# Patient Record
Sex: FEMALE | Employment: OTHER | URBAN - NONMETROPOLITAN AREA
[De-identification: names, ages, dates, MRNs, and addresses within clinical notes are randomized per-mention and may not be internally consistent; named-entity substitution may affect disease eponyms.]

---

## 2024-09-11 ENCOUNTER — OFFICE VISIT (OUTPATIENT)
Dept: CARDIOLOGY | Facility: CLINIC | Age: 74
End: 2024-09-11
Payer: MEDICARE

## 2024-09-11 VITALS
DIASTOLIC BLOOD PRESSURE: 80 MMHG | SYSTOLIC BLOOD PRESSURE: 166 MMHG | BODY MASS INDEX: 42.93 KG/M2 | HEIGHT: 61 IN | HEART RATE: 82 BPM | WEIGHT: 227.4 LBS

## 2024-09-11 DIAGNOSIS — Z78.9 NEVER SMOKED TOBACCO: ICD-10-CM

## 2024-09-11 DIAGNOSIS — I10 ESSENTIAL HYPERTENSION: ICD-10-CM

## 2024-09-11 DIAGNOSIS — C18.9 MALIGNANT NEOPLASM OF COLON, UNSPECIFIED PART OF COLON (MULTI): ICD-10-CM

## 2024-09-11 DIAGNOSIS — R06.02 SHORTNESS OF BREATH: ICD-10-CM

## 2024-09-11 PROBLEM — R07.9 CHEST PAIN: Status: ACTIVE | Noted: 2024-09-11

## 2024-09-11 PROBLEM — R07.9 CHEST PAIN: Status: RESOLVED | Noted: 2024-09-11 | Resolved: 2024-09-11

## 2024-09-11 PROCEDURE — 1036F TOBACCO NON-USER: CPT | Performed by: INTERNAL MEDICINE

## 2024-09-11 PROCEDURE — 99204 OFFICE O/P NEW MOD 45 MIN: CPT | Performed by: INTERNAL MEDICINE

## 2024-09-11 PROCEDURE — 3008F BODY MASS INDEX DOCD: CPT | Performed by: INTERNAL MEDICINE

## 2024-09-11 PROCEDURE — 3077F SYST BP >= 140 MM HG: CPT | Performed by: INTERNAL MEDICINE

## 2024-09-11 PROCEDURE — 93000 ELECTROCARDIOGRAM COMPLETE: CPT | Performed by: INTERNAL MEDICINE

## 2024-09-11 PROCEDURE — 1159F MED LIST DOCD IN RCRD: CPT | Performed by: INTERNAL MEDICINE

## 2024-09-11 PROCEDURE — 3079F DIAST BP 80-89 MM HG: CPT | Performed by: INTERNAL MEDICINE

## 2024-09-11 RX ORDER — MONTELUKAST SODIUM 10 MG/1
10 TABLET ORAL NIGHTLY
COMMUNITY

## 2024-09-11 RX ORDER — HYDROCHLOROTHIAZIDE 12.5 MG/1
12.5 CAPSULE ORAL EVERY MORNING
Qty: 90 CAPSULE | Refills: 3 | Status: SHIPPED | OUTPATIENT
Start: 2024-09-11 | End: 2025-09-11

## 2024-09-11 RX ORDER — SERTRALINE HYDROCHLORIDE 100 MG/1
2 TABLET, FILM COATED ORAL DAILY
COMMUNITY

## 2024-09-11 RX ORDER — POTASSIUM CHLORIDE 750 MG/1
2 TABLET, FILM COATED, EXTENDED RELEASE ORAL DAILY
COMMUNITY

## 2024-09-11 RX ORDER — NAPROXEN SODIUM 220 MG
220 TABLET ORAL EVERY 12 HOURS PRN
COMMUNITY

## 2024-09-11 RX ORDER — PROCHLORPERAZINE MALEATE 10 MG
10 TABLET ORAL EVERY 6 HOURS PRN
COMMUNITY

## 2024-09-11 RX ORDER — ONDANSETRON HYDROCHLORIDE 8 MG/1
8 TABLET, FILM COATED ORAL EVERY 8 HOURS PRN
COMMUNITY

## 2024-09-11 RX ORDER — METHYLPREDNISOLONE 4 MG/1
4 TABLET ORAL
COMMUNITY

## 2024-09-11 RX ORDER — LOSARTAN POTASSIUM 100 MG/1
100 TABLET ORAL DAILY
COMMUNITY

## 2024-09-11 RX ORDER — AMLODIPINE BESYLATE 5 MG/1
5 TABLET ORAL DAILY
COMMUNITY

## 2024-09-11 RX ORDER — LUTEIN 6 MG
1 TABLET ORAL DAILY
COMMUNITY

## 2024-09-11 NOTE — PROGRESS NOTES
Cardiology Consultation- New Consult    Reason for referral: Establish care, dx lung cancer for evaluation for shortness of breath    HPI: Malena Wilson is a 73 y.o. female with no prior cardiac history who recently was diagnosed apparently with metastatic colon cancer to the lung.  She is currently receiving chemotherapy.  She indicated to me that there is a possibility of proceeding with surgery and she is requesting preoperative risk assessment.  Patient reports symptoms of fatigue, tiredness and shortness of breath.  She denies chest pain, palpitation, lightheadedness, dizziness or syncope.  The patient admit to limited exercise tolerance due to her obesity and arthritis.    Assessment    1.  Symptoms of dyspnea on exertion and shortness of breath patient is moderate risk for ischemic heart disease.  Her symptoms could be sequela of her lung cancer  2.  Hypertension not well-controlled.  She reports she was on diuretic recently but this was discontinued because of slight elevation of her creatinine  3.  Morbid obesity  4.  Moderate risk for ischemic heart disease  5.  History of metastatic colon cancer to the lung receiving chemotherapy  6.  History of resection of colon cancer with previous colostomy  7.  Patient is requesting preoperative risk assessment for possible resection of her lung cancer    Plan    1.  I advised the patient to proceed with an echocardiogram and Lexiscan myocardial perfusion study in view of her symptoms of shortness of breath and inability to exercise due to morbid obesity and arthritis  2.  I advised the patient to take hydrochlorothiazide 12.5 mg daily to optimize blood pressure control and to have blood pressure check in 6 weeks  3.  Patient was advised to try to lose weight and exercise      Past Medical History:   Hypertension, history of colon cancer resection with colostomy.  And recent diagnosis of metastatic colon cancer to the lung she is receiving chemotherapy    Surgical  History:   She has a past surgical history that includes Hysterectomy (1995); Colostomy (2016); Portacath placement; and Colonoscopy (2024).    Family History:   Family History   Problem Relation Name Age of Onset    Hypertension Mother      Diabetes Mother      Hypertension Father      Diabetes Father         Social History:   Social History     Tobacco Use    Smoking status: Never    Smokeless tobacco: Never   Substance Use Topics    Alcohol use: Not Currently        Allergies:  Lisinopril     Current Medications:    Current Outpatient Medications:     amLODIPine (Norvasc) 5 mg tablet, Take 1 tablet (5 mg) by mouth once daily., Disp: , Rfl:     losartan (Cozaar) 100 mg tablet, Take 1 tablet (100 mg) by mouth once daily., Disp: , Rfl:     lutein 6 mg tablet, Take 1 tablet by mouth early in the morning.., Disp: , Rfl:     methylPREDNISolone (Medrol Dospak) 4 mg tablets, Take 1 tablet (4 mg) by mouth. Follow schedule on package instructions, Disp: , Rfl:     montelukast (Singulair) 10 mg tablet, Take 1 tablet (10 mg) by mouth once daily at bedtime., Disp: , Rfl:     mv-mn/folic ac/calcium/vit K1 (WOMEN'S 50 PLUS MULTIVITAMIN ORAL), Take 1 tablet by mouth once daily., Disp: , Rfl:     naproxen sodium (Aleve) 220 mg tablet, Take 1 tablet (220 mg) by mouth every 12 hours if needed for mild pain (1 - 3)., Disp: , Rfl:     ondansetron (Zofran) 8 mg tablet, Take 1 tablet (8 mg) by mouth every 8 hours if needed for nausea or vomiting., Disp: , Rfl:     potassium chloride CR (Klor-Con) 10 mEq ER tablet, Take 2 tablets (20 mEq) by mouth once daily. Do not crush, chew, or split., Disp: , Rfl:     prochlorperazine (Compazine) 10 mg tablet, Take 1 tablet (10 mg) by mouth every 6 hours if needed for nausea or vomiting., Disp: , Rfl:     sertraline (Zoloft) 100 mg tablet, Take 2 tablets (200 mg) by mouth once daily., Disp: , Rfl:     hydroCHLOROthiazide (Microzide) 12.5 mg capsule, Take 1 capsule (12.5 mg) by mouth once daily in  "the morning., Disp: 90 capsule, Rfl: 3     Vitals:  Vitals:    09/11/24 1430 09/11/24 1431   BP: 156/88 166/80   BP Location: Left arm Right arm   Patient Position: Sitting Sitting   Pulse: 82    Weight: 103 kg (227 lb 6.4 oz)    Height: 1.549 m (5' 1\")       EKG done in office today     Review of Systems   All other systems reviewed and are negative.      Objective         Physical Exam  Constitutional:       Appearance: Normal appearance.   HENT:      Nose: Nose normal.   Neck:      Vascular: No carotid bruit.   Cardiovascular:      Rate and Rhythm: Normal rate.      Pulses: Normal pulses.      Heart sounds: Normal heart sounds.   Pulmonary:      Effort: Pulmonary effort is normal.   Abdominal:      General: Bowel sounds are normal.      Palpations: Abdomen is soft.   Musculoskeletal:         General: Normal range of motion.      Cervical back: Normal range of motion.      Right lower leg: No edema.      Left lower leg: No edema.   Skin:     General: Skin is warm and dry.   Neurological:      General: No focal deficit present.      Mental Status: She is alert.   Psychiatric:         Mood and Affect: Mood normal.         Behavior: Behavior normal.         Thought Content: Thought content normal.         Judgment: Judgment normal.                Assessment and Plan:   1. Shortness of breath  ECG 12 Lead    Transthoracic Echo Complete    Nuclear Stress Test      2. Essential hypertension  hydroCHLOROthiazide (Microzide) 12.5 mg capsule    Follow Up In Cardiology    Follow Up In Cardiology      3. Malignant neoplasm of colon, unspecified part of colon (Multi)        4. BMI 40.0-44.9, adult (Multi)        5. Never smoked tobacco               Scribe Attestation  By signing my name below, Radha HAYES LPN   , Scrjere   attest that this documentation has been prepared under the direction and in the presence of Jeff Pope MD.    Provider Attestation - Scribe documentation    All medical record entries made by the " Scribe were at my direction and personally dictated by me. I have reviewed the chart and agree that the record accurately reflects my personal performance of the history, physical exam, discussion and plan.

## 2024-09-11 NOTE — LETTER
September 11, 2024     Hugo Perez DO  [address]  [facility] [address]    Patient: Malena Wilson   YOB: 1950   Date of Visit: 9/11/2024       Dear Dr. Hugo Perez DO:    Thank you for referring Malena Wilson to me for evaluation. Below are my notes for this consultation.  If you have questions, please do not hesitate to call me. I look forward to following your patient along with you.       Sincerely,     Jeff Pope MD      CC: Shan Sims MD  ______________________________________________________________________________________    Cardiology Consultation- New Consult    Reason for referral: Establish care, dx lung cancer for evaluation for shortness of breath    HPI: Malena Wilson is a 73 y.o. female with no prior cardiac history who recently was diagnosed apparently with metastatic colon cancer to the lung.  She is currently receiving chemotherapy.  She indicated to me that there is a possibility of proceeding with surgery and she is requesting preoperative risk assessment.  Patient reports symptoms of fatigue, tiredness and shortness of breath.  She denies chest pain, palpitation, lightheadedness, dizziness or syncope.  The patient admit to limited exercise tolerance due to her obesity and arthritis.    Assessment    1.  Symptoms of dyspnea on exertion and shortness of breath patient is moderate risk for ischemic heart disease.  Her symptoms could be sequela of her lung cancer  2.  Hypertension not well-controlled.  She reports she was on diuretic recently but this was discontinued because of slight elevation of her creatinine  3.  Morbid obesity  4.  Moderate risk for ischemic heart disease  5.  History of metastatic colon cancer to the lung receiving chemotherapy  6.  History of resection of colon cancer with previous colostomy  7.  Patient is requesting preoperative risk assessment for possible resection of her lung  cancer    Plan    1.  I advised the patient to proceed with an echocardiogram and Lexiscan myocardial perfusion study in view of her symptoms of shortness of breath and inability to exercise due to morbid obesity and arthritis  2.  I advised the patient to take hydrochlorothiazide 12.5 mg daily to optimize blood pressure control and to have blood pressure check in 6 weeks  3.  Patient was advised to try to lose weight and exercise      Past Medical History:   Hypertension, history of colon cancer resection with colostomy.  And recent diagnosis of metastatic colon cancer to the lung she is receiving chemotherapy    Surgical History:   She has a past surgical history that includes Hysterectomy (1995); Colostomy (2016); Portacath placement; and Colonoscopy (2024).    Family History:   Family History   Problem Relation Name Age of Onset   • Hypertension Mother     • Diabetes Mother     • Hypertension Father     • Diabetes Father         Social History:   Social History     Tobacco Use   • Smoking status: Never   • Smokeless tobacco: Never   Substance Use Topics   • Alcohol use: Not Currently        Allergies:  Lisinopril     Current Medications:    Current Outpatient Medications:   •  amLODIPine (Norvasc) 5 mg tablet, Take 1 tablet (5 mg) by mouth once daily., Disp: , Rfl:   •  losartan (Cozaar) 100 mg tablet, Take 1 tablet (100 mg) by mouth once daily., Disp: , Rfl:   •  lutein 6 mg tablet, Take 1 tablet by mouth early in the morning.., Disp: , Rfl:   •  methylPREDNISolone (Medrol Dospak) 4 mg tablets, Take 1 tablet (4 mg) by mouth. Follow schedule on package instructions, Disp: , Rfl:   •  montelukast (Singulair) 10 mg tablet, Take 1 tablet (10 mg) by mouth once daily at bedtime., Disp: , Rfl:   •  mv-mn/folic ac/calcium/vit K1 (WOMEN'S 50 PLUS MULTIVITAMIN ORAL), Take 1 tablet by mouth once daily., Disp: , Rfl:   •  naproxen sodium (Aleve) 220 mg tablet, Take 1 tablet (220 mg) by mouth every 12 hours if needed for  "mild pain (1 - 3)., Disp: , Rfl:   •  ondansetron (Zofran) 8 mg tablet, Take 1 tablet (8 mg) by mouth every 8 hours if needed for nausea or vomiting., Disp: , Rfl:   •  potassium chloride CR (Klor-Con) 10 mEq ER tablet, Take 2 tablets (20 mEq) by mouth once daily. Do not crush, chew, or split., Disp: , Rfl:   •  prochlorperazine (Compazine) 10 mg tablet, Take 1 tablet (10 mg) by mouth every 6 hours if needed for nausea or vomiting., Disp: , Rfl:   •  sertraline (Zoloft) 100 mg tablet, Take 2 tablets (200 mg) by mouth once daily., Disp: , Rfl:   •  hydroCHLOROthiazide (Microzide) 12.5 mg capsule, Take 1 capsule (12.5 mg) by mouth once daily in the morning., Disp: 90 capsule, Rfl: 3     Vitals:  Vitals:    09/11/24 1430 09/11/24 1431   BP: 156/88 166/80   BP Location: Left arm Right arm   Patient Position: Sitting Sitting   Pulse: 82    Weight: 103 kg (227 lb 6.4 oz)    Height: 1.549 m (5' 1\")       EKG done in office today     Review of Systems   All other systems reviewed and are negative.      Objective        Physical Exam  Constitutional:       Appearance: Normal appearance.   HENT:      Nose: Nose normal.   Neck:      Vascular: No carotid bruit.   Cardiovascular:      Rate and Rhythm: Normal rate.      Pulses: Normal pulses.      Heart sounds: Normal heart sounds.   Pulmonary:      Effort: Pulmonary effort is normal.   Abdominal:      General: Bowel sounds are normal.      Palpations: Abdomen is soft.   Musculoskeletal:         General: Normal range of motion.      Cervical back: Normal range of motion.      Right lower leg: No edema.      Left lower leg: No edema.   Skin:     General: Skin is warm and dry.   Neurological:      General: No focal deficit present.      Mental Status: She is alert.   Psychiatric:         Mood and Affect: Mood normal.         Behavior: Behavior normal.         Thought Content: Thought content normal.         Judgment: Judgment normal.                Assessment and Plan:   1. " Shortness of breath  ECG 12 Lead    Transthoracic Echo Complete    Nuclear Stress Test      2. Essential hypertension  hydroCHLOROthiazide (Microzide) 12.5 mg capsule    Follow Up In Cardiology    Follow Up In Cardiology      3. Malignant neoplasm of colon, unspecified part of colon (Multi)        4. BMI 40.0-44.9, adult (Multi)        5. Never smoked tobacco               Scribe Attestation  By signing my name below, Radha HAYES LPN   , Sirenaibanshu   attest that this documentation has been prepared under the direction and in the presence of Jeff Pope MD.    Provider Attestation - Scribe documentation    All medical record entries made by the Scribe were at my direction and personally dictated by me. I have reviewed the chart and agree that the record accurately reflects my personal performance of the history, physical exam, discussion and plan.

## 2024-09-23 ENCOUNTER — HOSPITAL ENCOUNTER (OUTPATIENT)
Dept: CARDIOLOGY | Facility: CLINIC | Age: 74
Discharge: HOME | End: 2024-09-23
Payer: MEDICARE

## 2024-09-23 ENCOUNTER — HOSPITAL ENCOUNTER (OUTPATIENT)
Dept: RADIOLOGY | Facility: CLINIC | Age: 74
Discharge: HOME | End: 2024-09-23
Payer: MEDICARE

## 2024-09-23 VITALS
HEIGHT: 61 IN | DIASTOLIC BLOOD PRESSURE: 84 MMHG | WEIGHT: 227 LBS | BODY MASS INDEX: 42.86 KG/M2 | SYSTOLIC BLOOD PRESSURE: 154 MMHG

## 2024-09-23 VITALS — SYSTOLIC BLOOD PRESSURE: 118 MMHG | DIASTOLIC BLOOD PRESSURE: 82 MMHG | HEART RATE: 74 BPM

## 2024-09-23 DIAGNOSIS — R06.02 SHORTNESS OF BREATH: ICD-10-CM

## 2024-09-23 PROCEDURE — 93306 TTE W/DOPPLER COMPLETE: CPT | Performed by: INTERNAL MEDICINE

## 2024-09-23 PROCEDURE — 78452 HT MUSCLE IMAGE SPECT MULT: CPT

## 2024-09-23 PROCEDURE — 2500000004 HC RX 250 GENERAL PHARMACY W/ HCPCS (ALT 636 FOR OP/ED): Performed by: INTERNAL MEDICINE

## 2024-09-23 PROCEDURE — 3430000001 HC RX 343 DIAGNOSTIC RADIOPHARMACEUTICALS: Performed by: INTERNAL MEDICINE

## 2024-09-23 PROCEDURE — 93306 TTE W/DOPPLER COMPLETE: CPT

## 2024-09-23 PROCEDURE — A9502 TC99M TETROFOSMIN: HCPCS | Performed by: INTERNAL MEDICINE

## 2024-09-23 PROCEDURE — 93017 CV STRESS TEST TRACING ONLY: CPT

## 2024-09-23 RX ORDER — REGADENOSON 0.08 MG/ML
0.4 INJECTION, SOLUTION INTRAVENOUS ONCE
Status: COMPLETED | OUTPATIENT
Start: 2024-09-23 | End: 2024-09-23

## 2024-09-24 ENCOUNTER — HOSPITAL ENCOUNTER (OUTPATIENT)
Dept: RADIOLOGY | Facility: CLINIC | Age: 74
Discharge: HOME | End: 2024-09-24
Payer: MEDICARE

## 2024-09-24 ENCOUNTER — TELEPHONE (OUTPATIENT)
Dept: CARDIOLOGY | Facility: CLINIC | Age: 74
End: 2024-09-24
Payer: MEDICARE

## 2024-09-24 LAB
AORTIC VALVE MEAN GRADIENT: 6 MMHG
AORTIC VALVE PEAK VELOCITY: 1.71 M/S
AV PEAK GRADIENT: 11.7 MMHG
AVA (PEAK VEL): 1.61 CM2
AVA (VTI): 1.63 CM2
EJECTION FRACTION APICAL 4 CHAMBER: 77.4
EJECTION FRACTION: 63 %
LEFT VENTRICLE INTERNAL DIMENSION DIASTOLE: 5.17 CM (ref 3.5–6)
LEFT VENTRICULAR OUTFLOW TRACT DIAMETER: 2 CM
LV EJECTION FRACTION BIPLANE: 69 %
MITRAL VALVE E/A RATIO: 0.62
MITRAL VALVE E/E' RATIO: 10.3
RIGHT VENTRICLE PEAK SYSTOLIC PRESSURE: 28.6 MMHG

## 2024-09-24 PROCEDURE — 3430000001 HC RX 343 DIAGNOSTIC RADIOPHARMACEUTICALS: Performed by: INTERNAL MEDICINE

## 2024-09-24 PROCEDURE — A9502 TC99M TETROFOSMIN: HCPCS | Performed by: INTERNAL MEDICINE

## 2024-09-24 NOTE — TELEPHONE ENCOUNTER
----- Message from Jeff Pope sent at 9/24/2024 12:44 PM EDT -----  Advise heart function looks good  ----- Message -----  From: Debi, Syngo - Cardiology Results In  Sent: 9/24/2024  12:30 PM EDT  To: Jeff Pope MD

## 2024-09-24 NOTE — TELEPHONE ENCOUNTER
Result Communication    Resulted Orders   Transthoracic Echo Complete   Result Value Ref Range    AV mn grad 6.0 mmHg    AV pk maicol 1.71 m/s    LV Biplane EF 69 %    LVOT diam 2.00 cm    MV E/A ratio 0.62     MV avg E/e' ratio 10.30     LV EF 63 %    RVSP 28.6 mmHg    LVIDd 5.17 cm    Aortic Valve Area by Continuity of Peak Velocity 1.61 cm2    AV pk grad 11.7 mmHg    Aortic Valve Area by Continuity of VTI 1.63 cm2    LV A4C EF 77.4     Narrative                   [address], [address] [place] [address]          Tel [phone] Fax [phone]    TRANSTHORACIC ECHOCARDIOGRAM REPORT    Patient Name:      RACHEL Hannah Physician:    [id number] Pope MD  Study Date:        9/23/2024             Ordering Provider:    [id number] BOBBY POPE  MRN/PID:           [id number]              Fellow:  Accession#:        [id number]          Nurse:  Date of Birth/Age: 1950 / 73 years Sonographer:          Marisol Jeffrey RDCS, RVT  Gender:            F                     Additional Staff:  Height:            154.94 cm             Admit Date:  Weight:            102.97 kg             Admission Status:  BSA / BMI:         1.99 m2 / 42.89 kg/m2 Department Location:  [facility]  Blood Pressure: 154 /84 mmHg    Study Type:    TRANSTHORACIC ECHO (TTE) COMPLETE  Diagnosis/ICD: Shortness of breath-R06.02  Indication:    HTN, Colon Cancer with Lung Metastases-Current Chemotherapy,                 Morbid Obesity  CPT Codes:     Echo Complete w Full [id number]   Study Detail: The following Echo studies were performed: 2D, M-Mode, Doppler and                color flow.       PHYSICIAN INTERPRETATION:  Left  Ventricle: The left ventricular systolic function is normal, with a visually estimated ejection fraction of 60-65%. There are no regional wall motion abnormalities. The left ventricular cavity size is normal. Spectral Doppler shows an impaired relaxation pattern of left ventricular diastolic filling. Mild concentric LVH.  Left Atrium: The left atrium is mild to moderately dilated.  Right Ventricle: The right ventricle is normal in size. There is normal right ventricular global systolic function.  Right Atrium: The right atrium is normal in size.  Aortic Valve: The aortic valve is trileaflet. There is mild aortic valve cusp calcification. The aortic valve dimensionless index is 0.52. There is no evidence of aortic valve regurgitation. The peak instantaneous gradient of the aortic valve is 11.7 mmHg. The mean gradient of the aortic valve is 6.0 mmHg.  Mitral Valve: The mitral valve is normal in structure. There is mild to moderate mitral annular calcification. There is trace mitral valve regurgitation.  Tricuspid Valve: The tricuspid valve is structurally normal. There is trace tricuspid regurgitation. The Doppler estimated RVSP is within normal limits at 28.6 mmHg.  Pulmonic Valve: The pulmonic valve is structurally normal. There is no indication of pulmonic valve regurgitation.  Pericardium: No pericardial effusion noted.  Aorta: The aortic root is normal.  Pulmonary Artery: The Doppler estimated pulmonary artery diastolic pressure is 12.4 mmHg.       CONCLUSIONS:   1. The left ventricular systolic function is normal, with a visually estimated ejection fraction of 60-65%.   2. Spectral Doppler shows an impaired relaxation pattern of left ventricular diastolic filling.   3. Mild concentric LVH.   4. There is normal right ventricular global systolic function.   5. The left atrium is mild to moderately dilated.   6. Trace mitral valve regurgitation.   7. Right ventricular within normal limits.   8. Trace tricuspid  regurgitation is visualized.   9. No previous study available for comparison.    QUANTITATIVE DATA SUMMARY:     2D MEASUREMENTS:            Normal Ranges:  Ao Root d:       2.70 cm    (2.0-3.7cm)  LAs:             4.70 cm    (2.7-4.0cm)  RVIDd:           3.17 cm    (0.9-3.6cm)  IVSd:            1.33 cm    (0.6-1.1cm)  LVPWd:           0.99 cm    (0.6-1.1cm)  LVIDd:           5.17 cm    (3.9-5.9cm)  LVIDs:           3.77 cm  LV Mass Index:   118.2 g/m2  LV % FS          27.1 %       LV SYSTOLIC FUNCTION BY 2D PLANIMETRY (MOD):                       Normal Ranges:  EF-A4C View:    77 % (>=55%)  EF-A2C View:    57 %  EF-Biplane:     69 %  EF-Visual:      63 %  LV EF Reported: 63 %       LV DIASTOLIC FUNCTION:           Normal Ranges:  MV Peak E:             0.60 m/s  (0.7-1.2 m/s)  MV Peak A:             0.97 m/s  (0.42-0.7 m/s)  E/A Ratio:             0.62      (1.0-2.2)  MV e'                  0.054 m/s (>8.0)  MV lateral e'          0.06 m/s  MV medial e'           0.05 m/s  E/e' Ratio:            11.13     (<8.0)       MITRAL VALVE:          Normal Ranges:  MV Vmax:      1.02 m/s (<=1.3m/s)  MV peak P.2 mmHg (<5mmHg)  MV mean P.0 mmHg (<48mmHg)       AORTIC VALVE:                      Normal Ranges:  AoV Vmax:                1.71 m/s  (<=1.7m/s)  AoV Peak P.7 mmHg (<20mmHg)  AoV Mean P.0 mmHg  (1.7-11.5mmHg)  LVOT Max Adán:            0.88 m/s  (<=1.1m/s)  AoV VTI:                 31.40 cm  (18-25cm)  LVOT VTI:                16.30 cm  LVOT Diameter:           2.00 cm   (1.8-2.4cm)  AoV Area, VTI:           1.63 cm2  (2.5-5.5cm2)  AoV Area,Vmax:           1.61 cm2  (2.5-4.5cm2)  AoV Dimensionless Index: 0.52       TRICUSPID VALVE/RVSP:          Normal Ranges:  Peak TR Velocity:     2.53 m/s  RV Syst Pressure:     29 mmHg  (< 30mmHg)       PULMONIC VALVE:           Normal Ranges:  PV Max Adán:     0.9 m/s   (0.6-0.9m/s)  PV Max PG:      3.4 mmHg  PIEDV:          1.53  m/s  PADP:           12.4 mmHg       [id number] Jeff Pope MD  Electronically signed on 9/24/2024 at 12:29:56 PM         ** Final **         1:29 PM      Results were successfully communicated with the patient and they acknowledged their understanding.

## 2024-09-25 ENCOUNTER — TELEPHONE (OUTPATIENT)
Dept: CARDIOLOGY | Facility: CLINIC | Age: 74
End: 2024-09-25
Payer: MEDICARE

## 2024-09-25 NOTE — TELEPHONE ENCOUNTER
Result Communication    Resulted Orders   Nuclear Stress Test    Narrative    Interpreted By:  Jasbir Jamison and Giannuzzi Michael   STUDY:  MYOCARDIAL PERFUSION STRESS TEST WITH LEXISCAN      Performing facility:  [facility],  [address] [place] [address], [address],  [place] [address]  [facility] Provider:  Jeff Pope MD  PCP:  Dr. EVAN Perez  Supervising provider:  Jeff Pope MD      INDICATION:  SOB;      HISTORY:  Gender:  F; Age:  74 y/o ; Height:  .9 cm cm; Weight:   WT  102.967 kg kg.      HTN;  SOB;  Denies smoking.          COMPARISON:  No comparison.          ACCESSION NUMBER(S):  [id number]      ORDERING CLINICIAN:  JEFF POPE      TECHNIQUE:  TWO DAY protocol.  Stress injection: Date:9-23-24, 35.7 mCi of Myoview IV 20 seconds  after rapid injection of Lexiscan. Rest injection: Date: 9-24-24,  35.2 mCi of Myoview IV at rest. The patient had a rapid injection of  0.4 mg of Lexiscan IV over 10 seconds. Imaging was performed by  gated tomographic technique. Reason for Lexiscan: Inability to  exercise      STRESS TEST DATA:  Resting heart rate was 74 BPM.  Resting blood pressure was 118/82 mmHg.  Peak blood pressure was 112/78 mmHg.  Peak heart rate was 98 BPM.      TEST TERMINATED DUE TO:  Protocol completed      FINDINGS:  STRESS TEST RESULTS:      Resting electrocardiogram revealed normal sinus rhythm.  There were no significant ischemic ECG changes or dysrhythmias.  The patient did not have chest pains/symptoms during procedure.  There was a normal recovery phase.      IMAGING RESULTS:      Image quality was good.  Rest and stress tomographic images were reviewed and revealed normal  perfusion without evidence of ischemia, myocardial infarction, or  left ventricular dilatation with stress. Overall left ventricular  systolic function appeared to be normal without regional wall motion  abnormalities. Ejection fraction was 65%.  TID is 0.81 and is  normal.  There was no evidence of  attenuation artifact.        Impression    Normal Sightlogixiscan Myoview cardiac perfusion stress test.  No evidence of ischemia or myocardial infarction by perfusion imaging.  Normal left ventricular systolic function, ejection fraction 65%.  No previous studies are available for comparison                   .      Signed by: Jasbir Jamison 9/24/2024 2:49 PM  Dictation workstation:   [id number]       1:12 PM      Results were successfully communicated with the patient and they acknowledged their understanding.

## 2024-09-25 NOTE — TELEPHONE ENCOUNTER
----- Message from Jeff Pope sent at 9/24/2024  4:52 PM EDT -----  Advise stress test is normal.  Patient can proceed with her surgery.  Operative risk is acceptable cardiac wise  ----- Message -----  From: Interface, Radiology Results In  Sent: 9/24/2024   2:50 PM EDT  To: Jeff Pope MD

## 2024-10-02 ENCOUNTER — APPOINTMENT (OUTPATIENT)
Dept: CARDIOLOGY | Facility: CLINIC | Age: 74
End: 2024-10-02
Payer: MEDICARE

## 2025-03-24 ENCOUNTER — APPOINTMENT (OUTPATIENT)
Dept: CARDIOLOGY | Facility: CLINIC | Age: 75
End: 2025-03-24
Payer: MEDICARE

## 2025-09-09 ENCOUNTER — APPOINTMENT (OUTPATIENT)
Dept: CARDIOLOGY | Facility: CLINIC | Age: 75
End: 2025-09-09
Payer: MEDICARE